# Patient Record
Sex: FEMALE
[De-identification: names, ages, dates, MRNs, and addresses within clinical notes are randomized per-mention and may not be internally consistent; named-entity substitution may affect disease eponyms.]

---

## 2021-01-17 ENCOUNTER — NURSE TRIAGE (OUTPATIENT)
Dept: OTHER | Facility: CLINIC | Age: 26
End: 2021-01-17

## 2021-01-17 NOTE — TELEPHONE ENCOUNTER
Reason for Disposition   Blood in urine (red, pink, or tea-colored)    Answer Assessment - Initial Assessment Questions  1. SEVERITY: \"How bad is the pain? \"  (e.g., Scale 1-10; mild, moderate, or severe)    - MILD (1-3): complains slightly about urination hurting    - MODERATE (4-7): interferes with normal activities      - SEVERE (8-10): excruciating, unwilling or unable to urinate because of the pain       Having burning and irritation    2. FREQUENCY: \"How many times have you had painful urination today? \"   Will have urgency. Not always frequent    3. PATTERN: \"Is pain present every time you urinate or just sometimes? \"    has been intermittent    4. ONSET: \"When did the painful urination start? \"   Over the past week, has been intermittent    5. FEVER: \"Do you have a fever? \" If so, ask: \"What is your temperature, how was it measured, and when did it start? \"    Hasn't checked but doesn't think she has a fever    6. PAST UTI: \"Have you had a urine infection before? \" If so, ask: \"When was the last time? \" and \"What happened that time? \"     No history of uti    7. CAUSE: \"What do you think is causing the painful urination? \"  (e.g., UTI, scratch, Herpes sore)      8. OTHER SYMPTOMS: \"Do you have any other symptoms? \" (e.g., flank pain, vaginal discharge, genital sores, urgency, blood in urine)    Blood in urine-no clots, odor to urine,   No vaginal discharge, no flank or back pain, no vomiting    9. PREGNANCY: \"Is there any chance you are pregnant? \" \"When was your last menstrual period? \"   Has IUD    Protocols used: URINATION PAIN - FEMALE-ADULT-    Brief description of triage: Has had intermittent burning with urination and irritation. Today has noticed blood in her urine. Urine has bad odor. No fever, no vomiting, no flank or back pain. Triage indicates for patient to be seen within 24 hours.     Care advice provided, patient verbalizes understanding; denies any other questions or concerns; instructed to call back for any new or worsening symptoms. This triage is a result of a call to [address] [place] [address]. Please do not respond to the triage nurse through this encounter. Any subsequent communication should be directly with the patient.